# Patient Record
Sex: FEMALE | Race: WHITE | Employment: FULL TIME | URBAN - METROPOLITAN AREA
[De-identification: names, ages, dates, MRNs, and addresses within clinical notes are randomized per-mention and may not be internally consistent; named-entity substitution may affect disease eponyms.]

---

## 2017-01-23 NOTE — TELEPHONE ENCOUNTER
Received refill request from [employer] for Alprazolam 0.5 mg  take 1/2 to 1 tablet by mouth twice daily as needed for anxiety and sleep. This medication was refilled      4/28/16 by Duncan Cristina. The patient saw Shani Garcia 9/27/16.  Her anxiety was disc

## 2017-01-24 RX ORDER — ALPRAZOLAM 0.5 MG/1
TABLET ORAL
Qty: 30 TABLET | Refills: 0 | Status: SHIPPED | OUTPATIENT
Start: 2017-01-24 | End: 2017-03-23

## 2017-01-24 NOTE — TELEPHONE ENCOUNTER
Will refill.   Please call in: 1/2 to 1 tab PO BID prn anxiety  #30, no refills    Thanks,  Joyce Macedo

## 2017-03-23 ENCOUNTER — OFFICE VISIT (OUTPATIENT)
Dept: FAMILY MEDICINE CLINIC | Facility: CLINIC | Age: 47
End: 2017-03-23

## 2017-03-23 ENCOUNTER — APPOINTMENT (OUTPATIENT)
Dept: LAB | Age: 47
End: 2017-03-23
Attending: FAMILY MEDICINE
Payer: MEDICAID

## 2017-03-23 VITALS
DIASTOLIC BLOOD PRESSURE: 76 MMHG | BODY MASS INDEX: 33.43 KG/M2 | SYSTOLIC BLOOD PRESSURE: 118 MMHG | HEIGHT: 67 IN | TEMPERATURE: 99 F | HEART RATE: 64 BPM | RESPIRATION RATE: 14 BRPM | WEIGHT: 213 LBS

## 2017-03-23 DIAGNOSIS — B37.3 CANDIDA VAGINITIS: ICD-10-CM

## 2017-03-23 DIAGNOSIS — B97.7 HPV IN FEMALE: ICD-10-CM

## 2017-03-23 DIAGNOSIS — R87.810 ASCUS WITH POSITIVE HIGH RISK HPV CERVICAL: ICD-10-CM

## 2017-03-23 DIAGNOSIS — Z01.419 WELL WOMAN EXAM WITH ROUTINE GYNECOLOGICAL EXAM: Primary | ICD-10-CM

## 2017-03-23 DIAGNOSIS — Z11.3 ROUTINE SCREENING FOR STI (SEXUALLY TRANSMITTED INFECTION): ICD-10-CM

## 2017-03-23 DIAGNOSIS — Z12.31 VISIT FOR SCREENING MAMMOGRAM: ICD-10-CM

## 2017-03-23 DIAGNOSIS — R87.610 ASCUS WITH POSITIVE HIGH RISK HPV CERVICAL: ICD-10-CM

## 2017-03-23 DIAGNOSIS — Z01.419 WELL WOMAN EXAM WITH ROUTINE GYNECOLOGICAL EXAM: ICD-10-CM

## 2017-03-23 DIAGNOSIS — H60.332 ACUTE SWIMMER'S EAR OF LEFT SIDE: ICD-10-CM

## 2017-03-23 LAB
ALBUMIN SERPL-MCNC: 4 G/DL (ref 3.5–4.8)
ALP LIVER SERPL-CCNC: 71 U/L (ref 39–100)
ALT SERPL-CCNC: 22 U/L (ref 14–54)
AST SERPL-CCNC: 8 U/L (ref 15–41)
BILIRUB SERPL-MCNC: 0.4 MG/DL (ref 0.1–2)
BUN BLD-MCNC: 14 MG/DL (ref 8–20)
CALCIUM BLD-MCNC: 9.2 MG/DL (ref 8.3–10.3)
CHLORIDE: 103 MMOL/L (ref 101–111)
CHOLEST SMN-MCNC: 233 MG/DL (ref ?–200)
CO2: 29 MMOL/L (ref 22–32)
CREAT BLD-MCNC: 0.79 MG/DL (ref 0.55–1.02)
GLUCOSE BLD-MCNC: 81 MG/DL (ref 70–99)
HDLC SERPL-MCNC: 46 MG/DL (ref 45–?)
HDLC SERPL: 5.07 {RATIO} (ref ?–4.44)
LDLC SERPL CALC-MCNC: 162 MG/DL (ref ?–130)
M PROTEIN MFR SERPL ELPH: 7.5 G/DL (ref 6.1–8.3)
NONHDLC SERPL-MCNC: 187 MG/DL (ref ?–130)
POTASSIUM SERPL-SCNC: 3.9 MMOL/L (ref 3.6–5.1)
SODIUM SERPL-SCNC: 139 MMOL/L (ref 136–144)
T PALLIDUM AB SER QL IA: NONREACTIVE
TRIGLYCERIDES: 124 MG/DL (ref ?–150)
VLDL: 25 MG/DL (ref 5–40)

## 2017-03-23 PROCEDURE — 87491 CHLMYD TRACH DNA AMP PROBE: CPT | Performed by: FAMILY MEDICINE

## 2017-03-23 PROCEDURE — 86780 TREPONEMA PALLIDUM: CPT

## 2017-03-23 PROCEDURE — 87389 HIV-1 AG W/HIV-1&-2 AB AG IA: CPT

## 2017-03-23 PROCEDURE — 87591 N.GONORRHOEAE DNA AMP PROB: CPT | Performed by: FAMILY MEDICINE

## 2017-03-23 PROCEDURE — 36415 COLL VENOUS BLD VENIPUNCTURE: CPT

## 2017-03-23 PROCEDURE — 80061 LIPID PANEL: CPT

## 2017-03-23 PROCEDURE — 87624 HPV HI-RISK TYP POOLED RSLT: CPT | Performed by: FAMILY MEDICINE

## 2017-03-23 PROCEDURE — 88175 CYTOPATH C/V AUTO FLUID REDO: CPT | Performed by: FAMILY MEDICINE

## 2017-03-23 PROCEDURE — 99396 PREV VISIT EST AGE 40-64: CPT | Performed by: FAMILY MEDICINE

## 2017-03-23 PROCEDURE — 80053 COMPREHEN METABOLIC PANEL: CPT

## 2017-03-23 RX ORDER — VALACYCLOVIR HYDROCHLORIDE 500 MG/1
TABLET, FILM COATED ORAL
Qty: 30 TABLET | Refills: 0 | Status: SHIPPED | OUTPATIENT
Start: 2017-03-23 | End: 2017-05-16

## 2017-03-23 RX ORDER — FLUCONAZOLE 150 MG/1
150 TABLET ORAL ONCE
Qty: 8 TABLET | Refills: 0 | Status: SHIPPED | OUTPATIENT
Start: 2017-03-23 | End: 2017-03-23

## 2017-03-23 RX ORDER — HYDROCORTISONE AND ACETIC ACID 1.1; 2.41 G/100ML; G/100ML
3 SOLUTION AURICULAR (OTIC) 2 TIMES DAILY
Qty: 1 BOTTLE | Refills: 0 | Status: SHIPPED | OUTPATIENT
Start: 2017-03-23 | End: 2017-09-01 | Stop reason: ALTCHOICE

## 2017-03-23 NOTE — TELEPHONE ENCOUNTER
valacyclovir has passed protocol refill sent per protocol alprazolam not on protocol sent to Dr Lucio Dawn to approve LOV 3/23/2017

## 2017-03-23 NOTE — PROGRESS NOTES
SUBJECTIVE:  Patient presents with:  Physical: WWE with Pap - Patient is fasting for labs    HPI:  Sister diagnosed with ovarian cancer- age 46  Tested for BRCA gene/genetic cancers which was negative    Concerned for yeast infection  Seems to be an on goi • Cancer Father      prostate   • Lipids Father    • Hypertension Father    • Diabetes Father    • Obesity Father      AAA   • Blood Disorder Other      aneurysm of the abdominal aorta   • Breast Cancer Maternal Aunt      postmenapausal age unknown   • B genitalia, vulva, vagina, cervix, uterus and adnexa, PAP: Pap smear done today, thin-prep method, thick white discharge  Extremities - peripheral pulses normal, no pedal edema, no clubbing or cyanosis    ASSESSMENT & PLAN:  Mari Ford is a 51year old today: needs Tdap but may need to get from health dept  Obesity screening: Body mass index is 33.35 kg/(m^2).  Discussed healthy diet and exercise  Hypertension screening: normotensive  Depression screening: negative  Diabetes screening:   -     Comp [employer]

## 2017-03-24 RX ORDER — ALPRAZOLAM 0.5 MG/1
TABLET ORAL
Qty: 30 TABLET | Refills: 0 | OUTPATIENT
Start: 2017-03-24 | End: 2017-09-01

## 2017-03-25 LAB
C TRACH DNA SPEC QL NAA+PROBE: NEGATIVE
N GONORRHOEA DNA SPEC QL NAA+PROBE: NEGATIVE

## 2017-03-27 LAB — HPV I/H RISK 1 DNA SPEC QL NAA+PROBE: NEGATIVE

## 2017-04-06 ENCOUNTER — OFFICE VISIT (OUTPATIENT)
Dept: FAMILY MEDICINE CLINIC | Facility: CLINIC | Age: 47
End: 2017-04-06

## 2017-04-06 ENCOUNTER — TELEPHONE (OUTPATIENT)
Dept: FAMILY MEDICINE CLINIC | Facility: CLINIC | Age: 47
End: 2017-04-06

## 2017-04-06 VITALS
TEMPERATURE: 98 F | SYSTOLIC BLOOD PRESSURE: 140 MMHG | RESPIRATION RATE: 16 BRPM | DIASTOLIC BLOOD PRESSURE: 90 MMHG | HEART RATE: 72 BPM

## 2017-04-06 DIAGNOSIS — R10.2 PELVIC PAIN: ICD-10-CM

## 2017-04-06 DIAGNOSIS — J01.00 ACUTE MAXILLARY SINUSITIS, RECURRENCE NOT SPECIFIED: Primary | ICD-10-CM

## 2017-04-06 DIAGNOSIS — Z80.41 FAMILY HISTORY OF OVARIAN CANCER: ICD-10-CM

## 2017-04-06 PROCEDURE — 99214 OFFICE O/P EST MOD 30 MIN: CPT | Performed by: FAMILY MEDICINE

## 2017-04-06 RX ORDER — CEFUROXIME AXETIL 250 MG/1
250 TABLET ORAL 2 TIMES DAILY
Qty: 20 TABLET | Refills: 0 | Status: SHIPPED | OUTPATIENT
Start: 2017-04-06 | End: 2017-09-01 | Stop reason: ALTCHOICE

## 2017-04-06 RX ORDER — FLUCONAZOLE 150 MG/1
150 TABLET ORAL ONCE
Qty: 1 TABLET | Refills: 2 | Status: SHIPPED | OUTPATIENT
Start: 2017-04-06 | End: 2017-04-06

## 2017-04-06 NOTE — PROGRESS NOTES
Danitza Cruz is a 51year old female. S:  Patient presents today with the following concerns:  c/o facial sinus pressure, painful ears, R more than L, PND beginning 3 weeks getting increasingly worse. Questionable fevers.   Denies vomiting or diarrhe melanoma   • Cancer Father      prostate   • Lipids Father    • Hypertension Father    • Diabetes Father    • Obesity Father      AAA   • Blood Disorder Other      aneurysm of the abdominal aorta   • Breast Cancer Maternal Aunt      postmenapausal age unkn allergy meds but avoid decongestants as likely raising BP. Increase fluids, steam and rest.  U/S of pelvis and  ordered due to family hx and symptoms. Patient will follow up if symptoms worsen or do not improve.   Patient verbalizes understanding of

## 2017-04-06 NOTE — TELEPHONE ENCOUNTER
Patient was told by Dr. Adry Rosenbaum to contact office if her symptoms did not clear up. Patient states that her sinus infection is still lingering and would like medication.

## 2017-05-02 ENCOUNTER — APPOINTMENT (OUTPATIENT)
Dept: ULTRASOUND IMAGING | Age: 47
End: 2017-05-02
Attending: FAMILY MEDICINE
Payer: MEDICAID

## 2017-05-02 ENCOUNTER — APPOINTMENT (OUTPATIENT)
Dept: LAB | Age: 47
End: 2017-05-02
Attending: FAMILY MEDICINE
Payer: MEDICAID

## 2017-05-02 ENCOUNTER — HOSPITAL ENCOUNTER (OUTPATIENT)
Dept: MAMMOGRAPHY | Age: 47
Discharge: HOME | End: 2017-05-02
Attending: FAMILY MEDICINE
Payer: MEDICAID

## 2017-05-02 DIAGNOSIS — Z80.41 FAMILY HISTORY OF OVARIAN CANCER: ICD-10-CM

## 2017-05-02 DIAGNOSIS — Z12.31 VISIT FOR SCREENING MAMMOGRAM: ICD-10-CM

## 2017-05-02 PROCEDURE — 86304 IMMUNOASSAY TUMOR CA 125: CPT

## 2017-05-02 PROCEDURE — 36415 COLL VENOUS BLD VENIPUNCTURE: CPT

## 2017-05-02 PROCEDURE — 77067 SCR MAMMO BI INCL CAD: CPT

## 2017-05-11 ENCOUNTER — HOSPITAL ENCOUNTER (OUTPATIENT)
Dept: ULTRASOUND IMAGING | Age: 47
Discharge: HOME | End: 2017-05-11
Attending: FAMILY MEDICINE
Payer: MEDICAID

## 2017-05-11 DIAGNOSIS — Z80.41 FAMILY HISTORY OF OVARIAN CANCER: ICD-10-CM

## 2017-05-11 DIAGNOSIS — R10.2 PELVIC PAIN: ICD-10-CM

## 2017-05-11 PROCEDURE — 76856 US EXAM PELVIC COMPLETE: CPT | Performed by: FAMILY MEDICINE

## 2017-05-11 PROCEDURE — 76830 TRANSVAGINAL US NON-OB: CPT | Performed by: FAMILY MEDICINE

## 2017-05-18 RX ORDER — VALACYCLOVIR HYDROCHLORIDE 500 MG/1
TABLET, FILM COATED ORAL
Qty: 30 TABLET | Refills: 3 | Status: SHIPPED | OUTPATIENT
Start: 2017-05-18 | End: 2017-09-01

## 2017-09-01 ENCOUNTER — APPOINTMENT (OUTPATIENT)
Dept: LAB | Age: 47
End: 2017-09-01
Attending: FAMILY MEDICINE
Payer: COMMERCIAL

## 2017-09-01 ENCOUNTER — OFFICE VISIT (OUTPATIENT)
Dept: FAMILY MEDICINE CLINIC | Facility: CLINIC | Age: 47
End: 2017-09-01

## 2017-09-01 VITALS
RESPIRATION RATE: 16 BRPM | HEIGHT: 67 IN | DIASTOLIC BLOOD PRESSURE: 78 MMHG | WEIGHT: 204.81 LBS | BODY MASS INDEX: 32.15 KG/M2 | HEART RATE: 88 BPM | SYSTOLIC BLOOD PRESSURE: 136 MMHG

## 2017-09-01 DIAGNOSIS — B96.89 BV (BACTERIAL VAGINOSIS): ICD-10-CM

## 2017-09-01 DIAGNOSIS — F41.1 ANXIETY STATE: ICD-10-CM

## 2017-09-01 DIAGNOSIS — N92.1 MENORRHAGIA WITH IRREGULAR CYCLE: ICD-10-CM

## 2017-09-01 DIAGNOSIS — K58.0 IRRITABLE BOWEL SYNDROME WITH DIARRHEA: ICD-10-CM

## 2017-09-01 DIAGNOSIS — B37.3 RECURRENT CANDIDIASIS OF VAGINA: ICD-10-CM

## 2017-09-01 DIAGNOSIS — E78.2 MIXED HYPERLIPIDEMIA: Primary | ICD-10-CM

## 2017-09-01 DIAGNOSIS — B00.9 HERPES SIMPLEX DISEASE: ICD-10-CM

## 2017-09-01 DIAGNOSIS — F33.41 RECURRENT MAJOR DEPRESSIVE DISORDER, IN PARTIAL REMISSION (HCC): ICD-10-CM

## 2017-09-01 DIAGNOSIS — N76.0 BV (BACTERIAL VAGINOSIS): ICD-10-CM

## 2017-09-01 DIAGNOSIS — E78.2 MIXED HYPERLIPIDEMIA: ICD-10-CM

## 2017-09-01 DIAGNOSIS — F41.1 GAD (GENERALIZED ANXIETY DISORDER): ICD-10-CM

## 2017-09-01 LAB
CHOLEST SMN-MCNC: 263 MG/DL (ref ?–200)
ERYTHROCYTE [DISTWIDTH] IN BLOOD BY AUTOMATED COUNT: 12.8 % (ref 11.5–16)
HCT VFR BLD AUTO: 40.8 % (ref 34–50)
HDLC SERPL-MCNC: 54 MG/DL (ref 45–?)
HDLC SERPL: 4.87 {RATIO} (ref ?–4.44)
HGB BLD-MCNC: 13.6 G/DL (ref 12–16)
LDLC SERPL CALC-MCNC: 181 MG/DL (ref ?–130)
LDLC SERPL-MCNC: 28 MG/DL (ref 5–40)
MCH RBC QN AUTO: 31 PG (ref 27–33.2)
MCHC RBC AUTO-ENTMCNC: 33.3 G/DL (ref 31–37)
MCV RBC AUTO: 92.9 FL (ref 81–100)
NONHDLC SERPL-MCNC: 209 MG/DL (ref ?–130)
PLATELET # BLD AUTO: 313 10(3)UL (ref 150–450)
RBC # BLD AUTO: 4.39 X10(6)UL (ref 3.8–5.1)
RED CELL DISTRIBUTION WIDTH-SD: 43.7 FL (ref 35.1–46.3)
TRIGLYCERIDES: 141 MG/DL (ref ?–150)
WBC # BLD AUTO: 5.1 X10(3) UL (ref 4–13)

## 2017-09-01 PROCEDURE — 80061 LIPID PANEL: CPT | Performed by: FAMILY MEDICINE

## 2017-09-01 PROCEDURE — 85027 COMPLETE CBC AUTOMATED: CPT | Performed by: FAMILY MEDICINE

## 2017-09-01 PROCEDURE — 86694 HERPES SIMPLEX NES ANTBDY: CPT | Performed by: FAMILY MEDICINE

## 2017-09-01 PROCEDURE — 99214 OFFICE O/P EST MOD 30 MIN: CPT | Performed by: FAMILY MEDICINE

## 2017-09-01 PROCEDURE — 36415 COLL VENOUS BLD VENIPUNCTURE: CPT | Performed by: FAMILY MEDICINE

## 2017-09-01 RX ORDER — METRONIDAZOLE 7.5 MG/G
GEL VAGINAL
COMMUNITY
Start: 2017-06-19 | End: 2017-09-01

## 2017-09-01 RX ORDER — FLUCONAZOLE 150 MG/1
TABLET ORAL
COMMUNITY
Start: 2017-07-31 | End: 2017-09-01

## 2017-09-01 RX ORDER — FLUCONAZOLE 150 MG/1
150 TABLET ORAL
Qty: 3 TABLET | Refills: 3 | Status: SHIPPED | OUTPATIENT
Start: 2017-09-01 | End: 2019-03-06

## 2017-09-01 RX ORDER — METRONIDAZOLE 7.5 MG/G
1 GEL VAGINAL NIGHTLY
Qty: 70 G | Refills: 5 | Status: SHIPPED | OUTPATIENT
Start: 2017-09-01 | End: 2019-03-06

## 2017-09-01 RX ORDER — VALACYCLOVIR HYDROCHLORIDE 500 MG/1
TABLET, FILM COATED ORAL
Qty: 90 TABLET | Refills: 3 | Status: SHIPPED | OUTPATIENT
Start: 2017-09-01 | End: 2018-04-28

## 2017-09-01 RX ORDER — VENLAFAXINE HYDROCHLORIDE 75 MG/1
CAPSULE, EXTENDED RELEASE ORAL
Qty: 90 CAPSULE | Refills: 3 | Status: SHIPPED | OUTPATIENT
Start: 2017-09-01 | End: 2018-04-28

## 2017-09-01 RX ORDER — ALPRAZOLAM 0.5 MG/1
TABLET ORAL
Qty: 30 TABLET | Refills: 1 | Status: SHIPPED | OUTPATIENT
Start: 2017-09-01 | End: 2018-07-05

## 2017-09-01 NOTE — PROGRESS NOTES
Chief Complaint:  Patient presents with:  Depression: Follow up meds.     HPI:  This is a 49year old female patient presenting for Depression (Follow up meds.)    Depression/ anxiety:  Notes she has had a lot of recent stress with her son's father and [place] this is related. Takes bentyl sparingly as this often causes lots of constipation  Denies abdominal pain, blood in stool    Menorrhagia, perimenopausal:  Notes worsening PMS including headaches and bloating.   Periods are irregular and heavy  Taking midol DAILY AS NEEDED FOR ANXIETY OR SLEEP Disp: 30 tablet Rfl: 1   Venlafaxine HCl ER 75 MG Oral Capsule SR 24 Hr TAKE ONE CAPSULE BY MOUTH ONCE DAILY Disp: 90 capsule Rfl: 3   Dicyclomine HCl (BENTYL) 10 MG Oral Cap TAKE ONE CAPSULE BY MOUTH FOUR TIMES DAILY A Visit Diagnoses     Recurrent major depressive disorder, in partial remission (HCC)        Relevant Medications    ValACYclovir HCl 500 MG Oral Tab    Venlafaxine HCl ER 75 MG Oral Capsule SR 24 Hr    Menorrhagia with irregular cycle        Relevant Orders

## 2017-09-02 LAB — HSV TYPE 1/2 COMBINED AB, IGG: 16.6 IV

## 2017-09-05 ENCOUNTER — OFFICE VISIT (OUTPATIENT)
Dept: FAMILY MEDICINE CLINIC | Facility: CLINIC | Age: 47
End: 2017-09-05

## 2017-09-05 VITALS
TEMPERATURE: 100 F | RESPIRATION RATE: 16 BRPM | BODY MASS INDEX: 32 KG/M2 | DIASTOLIC BLOOD PRESSURE: 70 MMHG | SYSTOLIC BLOOD PRESSURE: 110 MMHG | WEIGHT: 204 LBS | HEART RATE: 80 BPM

## 2017-09-05 DIAGNOSIS — J06.9 VIRAL UPPER RESPIRATORY TRACT INFECTION: Primary | ICD-10-CM

## 2017-09-05 PROCEDURE — 99213 OFFICE O/P EST LOW 20 MIN: CPT | Performed by: NURSE PRACTITIONER

## 2017-09-05 RX ORDER — CODEINE PHOSPHATE AND GUAIFENESIN 10; 100 MG/5ML; MG/5ML
SOLUTION ORAL EVERY 6 HOURS PRN
Qty: 180 ML | Refills: 0 | Status: SHIPPED | OUTPATIENT
Start: 2017-09-05 | End: 2018-04-28 | Stop reason: ALTCHOICE

## 2017-09-05 NOTE — PATIENT INSTRUCTIONS
Advise warm steam showers, nasal saline rinse. Rest, hydration with water. Over the counter Flonase and sudafed as needed for nasal congestion. Over the counter tylenol or ibuprofen for pain.  prescription at pharmacy as prescribed for cough.

## 2017-09-05 NOTE — PROGRESS NOTES
HPI:   Santy Fernandez is a 49year old female who presents for upper respiratory symptoms. Symptoms include:  Runny nose, nasal congestion, pnd, dry cough that hurts at times. Symptoms have been present for  3  days.   Over the counter medications tried: warm, pink and dry to exposed areas, no rash  EYES:  conjunctiva are clear  HEENT: atraumatic, normocephalic,TMs normal without erythema. Posterior oropharynx clear, no exudate. Bilateral nasal passages with enlarged turbinates and nasal congestion clear.

## 2017-09-17 ENCOUNTER — OFFICE VISIT (OUTPATIENT)
Dept: FAMILY MEDICINE CLINIC | Facility: CLINIC | Age: 47
End: 2017-09-17

## 2017-09-17 VITALS
SYSTOLIC BLOOD PRESSURE: 121 MMHG | TEMPERATURE: 98 F | OXYGEN SATURATION: 98 % | HEART RATE: 84 BPM | HEIGHT: 67 IN | DIASTOLIC BLOOD PRESSURE: 82 MMHG

## 2017-09-17 DIAGNOSIS — J01.90 ACUTE SINUSITIS, RECURRENCE NOT SPECIFIED, UNSPECIFIED LOCATION: ICD-10-CM

## 2017-09-17 DIAGNOSIS — J20.9 ACUTE BRONCHITIS WITH BRONCHOSPASM: Primary | ICD-10-CM

## 2017-09-17 PROCEDURE — 99213 OFFICE O/P EST LOW 20 MIN: CPT | Performed by: PHYSICIAN ASSISTANT

## 2017-09-17 RX ORDER — DOXYCYCLINE HYCLATE 100 MG/1
100 CAPSULE ORAL 2 TIMES DAILY
Qty: 20 CAPSULE | Refills: 0 | Status: SHIPPED | OUTPATIENT
Start: 2017-09-17 | End: 2017-09-27

## 2017-09-17 RX ORDER — METHYLPREDNISOLONE 4 MG/1
TABLET ORAL
Qty: 1 KIT | Refills: 0 | Status: SHIPPED | OUTPATIENT
Start: 2017-09-17 | End: 2018-04-28 | Stop reason: ALTCHOICE

## 2017-09-17 RX ORDER — ALBUTEROL SULFATE 90 UG/1
2 AEROSOL, METERED RESPIRATORY (INHALATION) EVERY 4 HOURS PRN
Qty: 1 INHALER | Refills: 0 | Status: SHIPPED | OUTPATIENT
Start: 2017-09-17 | End: 2017-10-17

## 2017-09-17 NOTE — PROGRESS NOTES
CHIEF COMPLAINT:   Patient presents with:  Cough    HPI:   Mayo Rutledge is a 49year old female who presents for upper and lower respiratory symptoms for  2 weeks.  Patient reports fatigue, PND, sore throat only at the beginning of sx's, congestion, low Dicyclomine HCl (BENTYL) 10 MG Oral Cap TAKE ONE CAPSULE BY MOUTH FOUR TIMES DAILY AS NEEDED FOR ABDOMINAL CRAMPING AND DIARRHEA Disp: 90 capsule Rfl: 1      Past Medical History:   Diagnosis Date   • Closed fracture of upper end of fibula 6/29/2012   • Ot ASSESSMENT AND PLAN:   Juany Leone is a 49year old female who presents with     ASSESSMENT: Acute bronchitis with bronchospasm  (primary encounter diagnosis)  Acute sinusitis, recurrence not specified, unspecified location     PLAN: Meds as below- abx · You may use over-the-counter medicines to control fever or pain, unless another medicine was prescribed.  (Note: If you have chronic liver or kidney disease or have ever had a stomach ulcer or gastrointestinal bleeding, talk with your healthcare provider · Worsening weakness, drowsiness, headache, or stiff neck  · Trouble breathing, wheezing, or pain with breathing  Date Last Reviewed: 9/13/2015  © 7196-8267 [employer] [address] [place] [address], [address] [place] [address]. All rights reserved.  This inf · You may use over-the-counter medicine to control fever or pain, unless another medicine was prescribed.  Note: If you have chronic liver or kidney disease or have ever had a stomach ulcer or gastrointestinal bleeding, talk with your healthcare provider be · Weakness, drowsiness, headache, facial pain, ear pain, or a stiff neck  Call 911, or get immediate medical care  Contact emergency services right away if any of these occur.   · Coughing up blood  · Worsening weakness, drowsiness, headache, or stiff neck Your doctor may prescribe medications to help treat your sinusitis. If you have an infection, antibiotics can help clear it up. If you are prescribed antibiotics, take all pills on schedule until they are gone, even if you feel better.  Decongestants help r

## 2017-09-17 NOTE — PATIENT INSTRUCTIONS
Bronchitis, Antibiotic Treatment (Adult)    Bronchitis is an infection of the air passages (bronchial tubes) in your lungs. It often occurs when you have a cold.  This illness is contagious during the first few days and is spread through the air by coughi · Finish all antibiotic medicine. Do this even if you are feeling better after only a few days. Follow-up care  Follow up with your healthcare provider, or as advised. If you had an X-ray or ECG (electrocardiogram), a specialist will review it.  You will b If there is a lot of inflammation, air flow is restricted. The air passages may also go into spasm, especially if you have asthma. This causes wheezing and difficulty breathing even in people who do not have asthma. Bronchitis usually lasts 7 to 14 days. · If prescribed, finish all antibiotic medicine, even if you are feeling better after only a few days. Follow-up care  Follow up with your healthcare provider, or as advised. If you had an X-ray or ECG (electrocardiogram), a specialist will review it.  You Drinking extra fluids helps thin your mucus. This lets it drain from your sinuses more easily. Have a glass of water every hour or two. A humidifier helps in much the same way. Fluids can also offset the drying effects of certain medicines.  If you use a hu

## 2018-02-19 ENCOUNTER — APPOINTMENT (OUTPATIENT)
Dept: OTHER | Facility: HOSPITAL | Age: 48
End: 2018-02-19
Attending: PREVENTIVE MEDICINE

## 2018-04-09 ENCOUNTER — TELEPHONE (OUTPATIENT)
Dept: FAMILY MEDICINE CLINIC | Facility: CLINIC | Age: 48
End: 2018-04-09

## 2018-04-09 DIAGNOSIS — Z00.00 ROUTINE HEALTH MAINTENANCE: Primary | ICD-10-CM

## 2018-04-26 ENCOUNTER — APPOINTMENT (OUTPATIENT)
Dept: LAB | Age: 48
End: 2018-04-26
Attending: FAMILY MEDICINE
Payer: MEDICAID

## 2018-04-26 DIAGNOSIS — Z00.00 ROUTINE HEALTH MAINTENANCE: ICD-10-CM

## 2018-04-26 PROCEDURE — 80053 COMPREHEN METABOLIC PANEL: CPT

## 2018-04-26 PROCEDURE — 80061 LIPID PANEL: CPT

## 2018-04-27 ENCOUNTER — TELEPHONE (OUTPATIENT)
Dept: FAMILY MEDICINE CLINIC | Facility: CLINIC | Age: 48
End: 2018-04-27

## 2018-04-27 NOTE — TELEPHONE ENCOUNTER
LM for patient to discuss insurance and get new insurance she has [employer] and we do not take that since 9/30/17. New insurance?

## 2018-04-28 ENCOUNTER — OFFICE VISIT (OUTPATIENT)
Dept: FAMILY MEDICINE CLINIC | Facility: CLINIC | Age: 48
End: 2018-04-28

## 2018-04-28 VITALS
WEIGHT: 204 LBS | BODY MASS INDEX: 32.02 KG/M2 | RESPIRATION RATE: 14 BRPM | DIASTOLIC BLOOD PRESSURE: 70 MMHG | HEIGHT: 67 IN | SYSTOLIC BLOOD PRESSURE: 132 MMHG | HEART RATE: 80 BPM

## 2018-04-28 DIAGNOSIS — F33.41 RECURRENT MAJOR DEPRESSIVE DISORDER, IN PARTIAL REMISSION (HCC): ICD-10-CM

## 2018-04-28 DIAGNOSIS — Z00.00 WELL WOMAN EXAM WITHOUT GYNECOLOGICAL EXAM: Primary | ICD-10-CM

## 2018-04-28 DIAGNOSIS — F41.1 ANXIETY STATE: ICD-10-CM

## 2018-04-28 DIAGNOSIS — Z12.31 BREAST CANCER SCREENING BY MAMMOGRAM: ICD-10-CM

## 2018-04-28 DIAGNOSIS — F17.200 TOBACCO USE DISORDER: ICD-10-CM

## 2018-04-28 DIAGNOSIS — F41.1 GAD (GENERALIZED ANXIETY DISORDER): ICD-10-CM

## 2018-04-28 PROCEDURE — 99406 BEHAV CHNG SMOKING 3-10 MIN: CPT | Performed by: FAMILY MEDICINE

## 2018-04-28 PROCEDURE — 99396 PREV VISIT EST AGE 40-64: CPT | Performed by: FAMILY MEDICINE

## 2018-04-28 RX ORDER — DICYCLOMINE HYDROCHLORIDE 10 MG/1
CAPSULE ORAL
Qty: 90 CAPSULE | Refills: 1 | Status: SHIPPED | OUTPATIENT
Start: 2018-04-28

## 2018-04-28 RX ORDER — VALACYCLOVIR HYDROCHLORIDE 500 MG/1
TABLET, FILM COATED ORAL
Qty: 90 TABLET | Refills: 3 | Status: SHIPPED | OUTPATIENT
Start: 2018-04-28 | End: 2019-10-16

## 2018-04-28 RX ORDER — VENLAFAXINE HYDROCHLORIDE 75 MG/1
CAPSULE, EXTENDED RELEASE ORAL
Qty: 90 CAPSULE | Refills: 3 | Status: SHIPPED | OUTPATIENT
Start: 2018-04-28 | End: 2019-03-06

## 2018-04-28 NOTE — PROGRESS NOTES
SUBJECTIVE:  Patient presents with:  Physical    HPI:   Notes headaches and night sweats, in the pre-menopausal stage. Would like to get Mirena to help with symptoms. Notes lots of skin tags. In axilla and in groin. Has family hx of skin cancer.  Would Hypertension Father    • Diabetes Father    • Obesity Father      AAA   • Blood Disorder Other      aneurysm of the abdominal aorta   • Breast Cancer Maternal Aunt      postmenapausal age unknown   • Breast Cancer Paternal Aunt 62   • Breast Cancer Materna Medications    Venlafaxine HCl ER 75 MG Oral Capsule SR 24 Hr    ValACYclovir HCl 500 MG Oral Tab       Other    Anxiety state    Relevant Medications    Venlafaxine HCl ER 75 MG Oral Capsule SR 24 Hr      Other Visit Diagnoses     Well woman exam without Cancer screening:   -     [facility] SCREENING BILAT (CPT=77067); Future  STDs: Declines    Call or return to clinic prn if these symptoms worsen or fail to improve as anticipated.   Ra Hoover DO  4/28/2018 9:02 AM

## 2018-05-14 ENCOUNTER — TELEPHONE (OUTPATIENT)
Dept: FAMILY MEDICINE CLINIC | Facility: CLINIC | Age: 48
End: 2018-05-14

## 2018-05-14 NOTE — TELEPHONE ENCOUNTER
----- Message from [id number], DO sent at 4/28/2018  9:25 AM CDT -----  Patient interested in getting [id number] IUD. Can we begin approval process?   Thanks,  [id number], DO

## 2018-05-15 NOTE — TELEPHONE ENCOUNTER
Initiated PA for Mirena IUD insertion and device by calling [employer] and speaking with representative. Patient active and eligible and no authorization needed as long as this out patient procedure is done in network. This is a covered service.

## 2018-05-15 NOTE — TELEPHONE ENCOUNTER
Mirena ordered  Please contact patient and assist with scheduling appt for Mirena insertion (according to menses cycle)

## 2018-05-23 ENCOUNTER — OFFICE VISIT (OUTPATIENT)
Dept: FAMILY MEDICINE CLINIC | Facility: CLINIC | Age: 48
End: 2018-05-23

## 2018-05-23 VITALS
TEMPERATURE: 98 F | SYSTOLIC BLOOD PRESSURE: 130 MMHG | DIASTOLIC BLOOD PRESSURE: 80 MMHG | HEART RATE: 60 BPM | WEIGHT: 203 LBS | BODY MASS INDEX: 31.86 KG/M2 | HEIGHT: 67 IN

## 2018-05-23 DIAGNOSIS — Z30.430 ENCOUNTER FOR INSERTION OF INTRAUTERINE CONTRACEPTIVE DEVICE: ICD-10-CM

## 2018-05-23 DIAGNOSIS — Z30.014 ENCOUNTER FOR INITIAL PRESCRIPTION OF INTRAUTERINE CONTRACEPTIVE DEVICE (IUD): Primary | ICD-10-CM

## 2018-05-23 PROCEDURE — 81025 URINE PREGNANCY TEST: CPT | Performed by: FAMILY MEDICINE

## 2018-05-23 PROCEDURE — 58300 INSERT INTRAUTERINE DEVICE: CPT | Performed by: FAMILY MEDICINE

## 2018-05-23 NOTE — PROGRESS NOTES
S: Pt here for IUD insertion for menorrhagia.     O:    05/23/18  0758   BP: 130/80   Pulse: 60   Temp: 98.4 °F (36.9 °C)       A/P: Risks of the insertion procedure, including but not limited to cramping, displaced threads, ectopic pregnancy, embedment or

## 2018-05-31 ENCOUNTER — HOSPITAL ENCOUNTER (OUTPATIENT)
Dept: MAMMOGRAPHY | Age: 48
Discharge: HOME | End: 2018-05-31
Attending: FAMILY MEDICINE
Payer: MEDICAID

## 2018-05-31 DIAGNOSIS — Z12.31 BREAST CANCER SCREENING BY MAMMOGRAM: ICD-10-CM

## 2018-05-31 PROCEDURE — 77063 BREAST TOMOSYNTHESIS BI: CPT | Performed by: FAMILY MEDICINE

## 2018-05-31 PROCEDURE — 77067 SCR MAMMO BI INCL CAD: CPT | Performed by: FAMILY MEDICINE

## 2018-06-21 ENCOUNTER — OFFICE VISIT (OUTPATIENT)
Dept: FAMILY MEDICINE CLINIC | Facility: CLINIC | Age: 48
End: 2018-06-21

## 2018-06-21 VITALS
DIASTOLIC BLOOD PRESSURE: 74 MMHG | BODY MASS INDEX: 32.46 KG/M2 | SYSTOLIC BLOOD PRESSURE: 120 MMHG | HEIGHT: 67 IN | WEIGHT: 206.81 LBS | RESPIRATION RATE: 14 BRPM | TEMPERATURE: 98 F | HEART RATE: 80 BPM

## 2018-06-21 DIAGNOSIS — N89.8 VAGINAL DISCHARGE: ICD-10-CM

## 2018-06-21 DIAGNOSIS — Z30.431 ENCOUNTER FOR ROUTINE CHECKING OF INTRAUTERINE CONTRACEPTIVE DEVICE (IUD): Primary | ICD-10-CM

## 2018-06-21 PROCEDURE — 99213 OFFICE O/P EST LOW 20 MIN: CPT | Performed by: FAMILY MEDICINE

## 2018-06-21 PROCEDURE — 87510 GARDNER VAG DNA DIR PROBE: CPT | Performed by: FAMILY MEDICINE

## 2018-06-21 PROCEDURE — 87480 CANDIDA DNA DIR PROBE: CPT | Performed by: FAMILY MEDICINE

## 2018-06-21 PROCEDURE — 87660 TRICHOMONAS VAGIN DIR PROBE: CPT | Performed by: FAMILY MEDICINE

## 2018-06-21 NOTE — PROGRESS NOTES
Chief Complaint:  Patient presents with:  IUD: Mirena string check      HPI:  This is a 51year old female patient presenting for IUD (Mirena string check)    Here for string check. Also notes that she has been having some vaginal discharge and itching.  HA Prescriptions:  Varenicline Tartrate (CHANTIX STARTING MONTH BLANCA) 0.5 MG X 11 & 1 MG X 42 Oral Misc Takes per pack instructions.  Disp: 53 each Rfl: 0   Venlafaxine HCl ER 75 MG Oral Capsule SR 24 Hr TAKE ONE CAPSULE BY MOUTH ONCE DAILY Disp: 90 capsule Rfl Vaginal discharge        Relevant Orders    VAGINITIS/VAGINOSIS, DNA PROBE          Advised the following:  Sid Walker was seen today for iud.     Diagnoses and all orders for this visit:    Encounter for routine checking of intrauterine contraceptive brenda

## 2018-06-25 NOTE — PROGRESS NOTES
Discussed results with patient. Patient verbalized understanding. Pt still has about 3 days of the metronidazole gel remaining. Will this suffice?  She does not have insurance until 7/1/18 so she does not know if she could afford either the pills or gel w

## 2018-07-05 DIAGNOSIS — F41.1 GAD (GENERALIZED ANXIETY DISORDER): ICD-10-CM

## 2018-07-07 RX ORDER — ALPRAZOLAM 0.5 MG/1
TABLET ORAL
Qty: 30 TABLET | Refills: 0 | OUTPATIENT
Start: 2018-07-07 | End: 2019-03-06

## 2018-07-07 NOTE — TELEPHONE ENCOUNTER
Please phone in prescription  Please let me know if you have any questions.   [id number], DO 7/7/2018 12:30 PM

## 2018-07-07 NOTE — TELEPHONE ENCOUNTER
Refill request sent from pharmacy for Alprazolam. LOV 06/21/18. Will you approve? Routed to DR Adry Rosenbaum.

## 2018-11-14 ENCOUNTER — TELEPHONE (OUTPATIENT)
Dept: FAMILY MEDICINE CLINIC | Facility: CLINIC | Age: 48
End: 2018-11-14

## 2018-11-14 NOTE — TELEPHONE ENCOUNTER
Spoke to [employer], a representative from [employer] [phone] a few days ago. We also received a faxed letter dated November 6, 2018 relating to this matter.   Sia informed me the pt, who DID NOT, contact our office, was complaining about

## 2019-04-12 PROBLEM — M23.91 INTERNAL DERANGEMENT OF KNEE, RIGHT: Status: ACTIVE | Noted: 2019-04-12

## 2019-04-12 PROBLEM — S83.281A TEAR OF LATERAL MENISCUS OF RIGHT KNEE, UNSPECIFIED TEAR TYPE, UNSPECIFIED WHETHER OLD OR CURRENT TEAR, INITIAL ENCOUNTER: Status: ACTIVE | Noted: 2019-04-12

## 2019-04-12 PROBLEM — M25.851 IMPINGEMENT SYNDROME, HIP, RIGHT: Status: ACTIVE | Noted: 2019-04-12

## 2019-04-12 PROBLEM — M70.61 TROCHANTERIC BURSITIS OF RIGHT HIP: Status: ACTIVE | Noted: 2019-04-12

## 2019-04-21 ENCOUNTER — HOSPITAL ENCOUNTER (OUTPATIENT)
Dept: MRI IMAGING | Age: 49
Discharge: HOME | End: 2019-04-21
Attending: ORTHOPAEDIC SURGERY
Payer: COMMERCIAL

## 2019-04-21 DIAGNOSIS — S83.281A TEAR OF LATERAL MENISCUS OF RIGHT KNEE, UNSPECIFIED TEAR TYPE, UNSPECIFIED WHETHER OLD OR CURRENT TEAR, INITIAL ENCOUNTER: ICD-10-CM

## 2019-04-21 DIAGNOSIS — M23.91 INTERNAL DERANGEMENT OF KNEE, RIGHT: ICD-10-CM

## 2019-04-25 ENCOUNTER — HOSPITAL ENCOUNTER (OUTPATIENT)
Dept: MRI IMAGING | Age: 49
Discharge: HOME | End: 2019-04-25
Attending: ORTHOPAEDIC SURGERY
Payer: COMMERCIAL

## 2019-04-25 PROCEDURE — 73721 MRI JNT OF LWR EXTRE W/O DYE: CPT | Performed by: ORTHOPAEDIC SURGERY

## 2019-05-21 PROBLEM — M22.41 CHONDROMALACIA OF RIGHT PATELLA: Status: ACTIVE | Noted: 2019-05-21

## 2019-05-21 PROBLEM — S83.271D COMPLEX TEAR OF LATERAL MENISCUS OF RIGHT KNEE, UNSPECIFIED WHETHER OLD OR CURRENT TEAR, SUBSEQUENT ENCOUNTER: Status: ACTIVE | Noted: 2019-04-12

## 2020-02-04 PROBLEM — S93.402A MODERATE LEFT ANKLE SPRAIN, INITIAL ENCOUNTER: Status: ACTIVE | Noted: 2020-02-04

## 2020-02-04 PROBLEM — M72.2 PLANTAR FASCIITIS, LEFT: Status: ACTIVE | Noted: 2020-02-04

## 2020-07-15 PROBLEM — Z98.890 STATUS POST ARTHROSCOPIC SURGERY OF RIGHT KNEE: Status: ACTIVE | Noted: 2020-07-15

## (undated) NOTE — LETTER
Date: 9/5/2017    Patient Name: Maxi Salcedo          To Whom it may concern: This letter has been written at the patient's request. The above patient was seen at the [facility] for treatment of a medical condition.     This patient joaou

## (undated) NOTE — MR AVS SNAPSHOT
[facility] [place]  [place],  [address] [place] [address] [id number]               Thank you for choosing us for your health care visit with Shira Fajardo DO.   We are glad to serve you and happy to provide you with this sum Thursday March 23, 2017     Imaging:  FRANSICO SCREENING BILAT ([id number])    Instructions: To schedule an appointment for your radiology test please call Vera Garcia [address] Scheduling at [phone].          Reason for Today's Visit     Physical Where to Get Your Medications      These medications were sent to [place] [address] [address] [facility], [address] [facility] AT [place] [address], [phone], [address] [place], [address] [address]     Phone:  [phone]    -

## (undated) NOTE — MR AVS SNAPSHOT
[facility] [place]  [facility] [address]  [address] [place] [id number] [phone]               Thank you for choosing us for your health care [facility].   We are glad to serve you and happy to provide you with this Commonly known as:  XANAX           Cefuroxime Axetil 250 MG Tabs   Take 1 tablet (250 mg total) by mouth 2 (two) times daily.    Commonly known as:  CEFTIN           Dicyclomine HCl 10 MG Caps   TAKE ONE CAPSULE BY MOUTH FOUR TIMES DAILY AS NEEDED FOR ABDO dairy products with reduced content of saturated and total fat.    Dietary sodium reduction Reduce dietary sodium intake to <= 100 mmol per day (2.4 g sodium or 6 g sodium chloride)   Aerobic physical activity Regular aerobic physical activity (e.g., brisk